# Patient Record
Sex: MALE | Race: WHITE | Employment: FULL TIME | URBAN - NONMETROPOLITAN AREA
[De-identification: names, ages, dates, MRNs, and addresses within clinical notes are randomized per-mention and may not be internally consistent; named-entity substitution may affect disease eponyms.]

---

## 2019-07-20 ENCOUNTER — OFFICE VISIT (OUTPATIENT)
Dept: URGENT CARE | Age: 44
End: 2019-07-20
Payer: MEDICAID

## 2019-07-20 VITALS
DIASTOLIC BLOOD PRESSURE: 88 MMHG | HEART RATE: 106 BPM | RESPIRATION RATE: 18 BRPM | BODY MASS INDEX: 42.7 KG/M2 | SYSTOLIC BLOOD PRESSURE: 138 MMHG | OXYGEN SATURATION: 97 % | WEIGHT: 305 LBS | TEMPERATURE: 97.6 F | HEIGHT: 71 IN

## 2019-07-20 DIAGNOSIS — M54.42 ACUTE MIDLINE LOW BACK PAIN WITH BILATERAL SCIATICA: ICD-10-CM

## 2019-07-20 DIAGNOSIS — M54.41 ACUTE MIDLINE LOW BACK PAIN WITH BILATERAL SCIATICA: ICD-10-CM

## 2019-07-20 PROBLEM — M54.40 ACUTE MIDLINE LOW BACK PAIN WITH SCIATICA: Status: ACTIVE | Noted: 2019-07-20

## 2019-07-20 PROCEDURE — 99203 OFFICE O/P NEW LOW 30 MIN: CPT | Performed by: NURSE PRACTITIONER

## 2019-07-20 RX ORDER — MELOXICAM 15 MG/1
15 TABLET ORAL DAILY
Qty: 30 TABLET | Refills: 0 | Status: SHIPPED | OUTPATIENT
Start: 2019-07-20

## 2019-07-20 RX ORDER — DEXAMETHASONE SODIUM PHOSPHATE 100 MG/10ML
10 INJECTION INTRAMUSCULAR; INTRAVENOUS ONCE
Status: COMPLETED | OUTPATIENT
Start: 2019-07-20 | End: 2019-07-20

## 2019-07-20 RX ADMIN — DEXAMETHASONE SODIUM PHOSPHATE 10 MG: 100 INJECTION INTRAMUSCULAR; INTRAVENOUS at 14:00

## 2019-07-20 SDOH — HEALTH STABILITY: MENTAL HEALTH: HOW OFTEN DO YOU HAVE A DRINK CONTAINING ALCOHOL?: NEVER

## 2019-07-20 ASSESSMENT — ENCOUNTER SYMPTOMS
DIARRHEA: 0
VOMITING: 0
EYE DISCHARGE: 0
EYE REDNESS: 0
WHEEZING: 0
SORE THROAT: 0
BACK PAIN: 1
COUGH: 0

## 2019-08-13 ENCOUNTER — TRANSCRIBE ORDERS (OUTPATIENT)
Dept: ADMINISTRATIVE | Facility: HOSPITAL | Age: 44
End: 2019-08-13

## 2019-08-13 DIAGNOSIS — M54.16 LUMBAR RADICULOPATHY: Primary | ICD-10-CM

## 2019-08-16 ENCOUNTER — HOSPITAL ENCOUNTER (OUTPATIENT)
Dept: MRI IMAGING | Facility: HOSPITAL | Age: 44
Discharge: HOME | End: 2019-08-16
Admitting: NURSE PRACTITIONER

## 2019-08-16 DIAGNOSIS — M54.16 LUMBAR RADICULOPATHY: ICD-10-CM

## 2019-08-16 PROCEDURE — 72148 MRI LUMBAR SPINE W/O DYE: CPT

## 2023-03-25 DIAGNOSIS — G47.31 COMPLEX SLEEP APNEA SYNDROME: Primary | ICD-10-CM

## 2023-04-20 ENCOUNTER — HOSPITAL ENCOUNTER (EMERGENCY)
Facility: HOSPITAL | Age: 48
Discharge: HOME | End: 2023-04-20
Attending: EMERGENCY MEDICINE
Payer: COMMERCIAL

## 2023-04-20 ENCOUNTER — APPOINTMENT (OUTPATIENT)
Dept: GENERAL RADIOLOGY | Facility: HOSPITAL | Age: 48
End: 2023-04-20
Payer: COMMERCIAL

## 2023-04-20 VITALS
SYSTOLIC BLOOD PRESSURE: 131 MMHG | OXYGEN SATURATION: 95 % | DIASTOLIC BLOOD PRESSURE: 84 MMHG | RESPIRATION RATE: 20 BRPM | TEMPERATURE: 98.1 F | BODY MASS INDEX: 44.1 KG/M2 | HEIGHT: 71 IN | WEIGHT: 315 LBS | HEART RATE: 80 BPM

## 2023-04-20 DIAGNOSIS — R06.2 WHEEZING: ICD-10-CM

## 2023-04-20 DIAGNOSIS — H65.01 RIGHT ACUTE SEROUS OTITIS MEDIA, RECURRENCE NOT SPECIFIED: Primary | ICD-10-CM

## 2023-04-20 DIAGNOSIS — J40 BRONCHITIS: ICD-10-CM

## 2023-04-20 LAB
FLUAV RNA RESP QL NAA+PROBE: NOT DETECTED
FLUBV RNA RESP QL NAA+PROBE: NOT DETECTED
SARS-COV-2 RNA RESP QL NAA+PROBE: NOT DETECTED

## 2023-04-20 PROCEDURE — 99283 EMERGENCY DEPT VISIT LOW MDM: CPT

## 2023-04-20 PROCEDURE — 71045 X-RAY EXAM CHEST 1 VIEW: CPT

## 2023-04-20 PROCEDURE — 87636 SARSCOV2 & INF A&B AMP PRB: CPT | Performed by: NURSE PRACTITIONER

## 2023-04-20 PROCEDURE — 94640 AIRWAY INHALATION TREATMENT: CPT

## 2023-04-20 PROCEDURE — 94799 UNLISTED PULMONARY SVC/PX: CPT

## 2023-04-20 RX ORDER — AMOXICILLIN AND CLAVULANATE POTASSIUM 875; 125 MG/1; MG/1
1 TABLET, FILM COATED ORAL 2 TIMES DAILY
Qty: 20 TABLET | Refills: 0 | Status: SHIPPED | OUTPATIENT
Start: 2023-04-20 | End: 2023-04-30

## 2023-04-20 RX ORDER — CETIRIZINE HYDROCHLORIDE, PSEUDOEPHEDRINE HYDROCHLORIDE 5; 120 MG/1; MG/1
1 TABLET, FILM COATED, EXTENDED RELEASE ORAL 2 TIMES DAILY
Qty: 15 TABLET | Refills: 0 | Status: SHIPPED | OUTPATIENT
Start: 2023-04-20

## 2023-04-20 RX ORDER — ALBUTEROL SULFATE 2.5 MG/3ML
2.5 SOLUTION RESPIRATORY (INHALATION) EVERY 4 HOURS PRN
Qty: 20 EACH | Refills: 0 | Status: SHIPPED | OUTPATIENT
Start: 2023-04-20

## 2023-04-20 RX ORDER — IPRATROPIUM BROMIDE AND ALBUTEROL SULFATE 2.5; .5 MG/3ML; MG/3ML
3 SOLUTION RESPIRATORY (INHALATION) ONCE
Status: COMPLETED | OUTPATIENT
Start: 2023-04-20 | End: 2023-04-20

## 2023-04-20 RX ORDER — FLUTICASONE PROPIONATE 50 MCG
2 SPRAY, SUSPENSION (ML) NASAL DAILY
Qty: 9.9 ML | Refills: 0 | Status: SHIPPED | OUTPATIENT
Start: 2023-04-20

## 2023-04-20 RX ORDER — BENZONATATE 200 MG/1
200 CAPSULE ORAL 3 TIMES DAILY PRN
Qty: 15 CAPSULE | Refills: 0 | Status: SHIPPED | OUTPATIENT
Start: 2023-04-20

## 2023-04-20 RX ADMIN — IPRATROPIUM BROMIDE AND ALBUTEROL SULFATE 3 ML: .5; 3 SOLUTION RESPIRATORY (INHALATION) at 14:35

## 2023-04-20 NOTE — ED PROVIDER NOTES
"Subjective   History of Present Illness  Patient is a 47-year-old male who presents to the ER with chief complaints of cough with congestion.  He states he began developing symptoms on Monday.  He states initially he thought he just had a cold and since this time his cough has been deeper, reports having recorded fever, and has noted wheezing.  He believes he may have, \"walking pneumonia.\"  He also complains of right ear pain and believes he has another ear infection.  Patient denies any chest pain.  He has had no nausea or vomiting.  Reports coughing up white to yellowish phlegm.  No significant past medical history on file.        Review of Systems   Constitutional: Positive for fever.   HENT: Positive for congestion, ear pain and rhinorrhea.    Respiratory: Positive for cough and wheezing. Negative for shortness of breath.    Gastrointestinal: Negative.  Negative for abdominal pain, diarrhea, nausea and vomiting.   Genitourinary: Negative.  Negative for dysuria.   Musculoskeletal: Negative.  Negative for myalgias.   Skin: Negative.    All other systems reviewed and are negative.      Past Medical History:   Diagnosis Date   • Apnea        Allergies   Allergen Reactions   • Erythromycin Other (See Comments)     unknown       History reviewed. No pertinent surgical history.    History reviewed. No pertinent family history.    Social History     Socioeconomic History   • Marital status: Single   Tobacco Use   • Smoking status: Every Day     Packs/day: 1.00     Types: Cigarettes   Substance and Sexual Activity   • Alcohol use: Never   • Drug use: Never           Objective   Physical Exam  Vitals and nursing note reviewed.   Constitutional:       General: He is not in acute distress.     Appearance: He is well-developed. He is not diaphoretic.      Comments: No acute distress, sitting up in a chair in no distress, nontoxic-appearing   HENT:      Head: Atraumatic.      Right Ear: Ear canal and external ear normal.      " Left Ear: Tympanic membrane, ear canal and external ear normal.      Ears:      Comments: Right erythematous TM     Nose: Nose normal.      Mouth/Throat:      Mouth: Mucous membranes are moist.   Eyes:      General: No scleral icterus.     Extraocular Movements: Extraocular movements intact.      Conjunctiva/sclera: Conjunctivae normal.      Pupils: Pupils are equal, round, and reactive to light.   Neck:      Thyroid: No thyromegaly.      Vascular: No JVD.   Cardiovascular:      Rate and Rhythm: Normal rate and regular rhythm.      Heart sounds: Normal heart sounds. No murmur heard.  Pulmonary:      Effort: Pulmonary effort is normal. No respiratory distress.      Breath sounds: Wheezing and rales present.   Chest:      Chest wall: No tenderness.   Abdominal:      General: Bowel sounds are normal. There is no distension.      Palpations: Abdomen is soft. There is no mass.      Tenderness: There is no abdominal tenderness. There is no guarding or rebound.   Musculoskeletal:         General: Normal range of motion.      Cervical back: Normal range of motion and neck supple.   Lymphadenopathy:      Cervical: No cervical adenopathy.   Skin:     General: Skin is warm and dry.      Capillary Refill: Capillary refill takes less than 2 seconds.      Coloration: Skin is not pale.      Findings: No erythema or rash.   Neurological:      Mental Status: He is alert and oriented to person, place, and time.      Cranial Nerves: No cranial nerve deficit.      Coordination: Coordination normal.      Deep Tendon Reflexes: Reflexes are normal and symmetric.   Psychiatric:         Mood and Affect: Mood normal.         Behavior: Behavior normal.         Thought Content: Thought content normal.         Judgment: Judgment normal.         Procedures           ED Course                                           Medical Decision Making  ViralPatient is a 47-year-old male who presents to the ER with chief complaints of cough with congestion.  " He states he began developing symptoms on Monday.  He states initially he thought he just had a cold and since this time his cough has been deeper, reports having recorded fever, and has noted wheezing.  He believes he may have, \"walking pneumonia.\"  He also complains of right ear pain and believes he has another ear infection.  Patient denies any chest pain.  He has had no nausea or vomiting.  Reports coughing up white to yellowish phlegm.  No significant past medical history on file.     Patient tells the nursing staff he just started using a cpap last night for the first time.  Differential diagnosis: Illness, bronchitis, pneumonia, otitis media, and other    Labs Reviewed  COVID-19 AND FLU A/B, NP SWAB IN TRANSPORT MEDIA 8-12 HR TAT - Normal  XR Chest 1 View   Final Result    Moderate pulmonary hypoventilation, no acute infiltrates.    This report was finalized on 04/20/2023 14:24 by Dr. Jose Burt MD.  Patient received a breathing treatment in the emergency department.  We will prescribe breathing treatments, antibiotics for his otitis media, cough medicine, nasal spray, and a decongestant with antihistamine.  He will need to follow-up with his PCP for reevaluation.    Bronchitis: acute illness or injury  Right acute serous otitis media, recurrence not specified: acute illness or injury  Wheezing: acute illness or injury  Amount and/or Complexity of Data Reviewed  Radiology: ordered.      Risk  OTC drugs.  Prescription drug management.          Final diagnoses:   Right acute serous otitis media, recurrence not specified   Bronchitis   Wheezing       ED Disposition  ED Disposition     ED Disposition   Discharge    Condition   Good    Comment   --             No follow-up provider specified.       Medication List      New Prescriptions    albuterol (2.5 MG/3ML) 0.083% nebulizer solution  Commonly known as: PROVENTIL  Take 2.5 mg by nebulization Every 4 (Four) Hours As Needed for Wheezing.   "   amoxicillin-clavulanate 875-125 MG per tablet  Commonly known as: AUGMENTIN  Take 1 tablet by mouth 2 (Two) Times a Day for 10 days.     benzonatate 200 MG capsule  Commonly known as: TESSALON  Take 1 capsule by mouth 3 (Three) Times a Day As Needed for Cough.     cetirizine-pseudoephedrine 5-120 MG per 12 hr tablet  Commonly known as: ZyrTEC-D  Take 1 tablet by mouth 2 (Two) Times a Day.     fluticasone 50 MCG/ACT nasal spray  Commonly known as: FLONASE  2 sprays into the nostril(s) as directed by provider Daily.           Where to Get Your Medications      These medications were sent to [facility] [place] [id number] - [phone] [facility] [phone] [address] [place] [address]    Phone: [phone]   · albuterol (2.5 MG/3ML) 0.083% nebulizer solution  · amoxicillin-clavulanate 875-125 MG per tablet  · benzonatate 200 MG capsule  · cetirizine-pseudoephedrine 5-120 MG per 12 hr tablet  · fluticasone 50 MCG/ACT nasal spray          Cecile Schumacher, APRN  04/20/23 [id number]

## 2023-04-20 NOTE — DISCHARGE INSTRUCTIONS
Breathing treatments every 4-6 hours as needed for wheezing  Push fluids; maintain fever control; f/u with pcp with no improvements

## 2023-05-16 ENCOUNTER — OFFICE VISIT (OUTPATIENT)
Dept: PULMONOLOGY | Age: 48
End: 2023-05-16
Payer: MEDICAID

## 2023-05-16 VITALS
HEART RATE: 88 BPM | BODY MASS INDEX: 44.1 KG/M2 | SYSTOLIC BLOOD PRESSURE: 136 MMHG | OXYGEN SATURATION: 96 % | TEMPERATURE: 97.6 F | WEIGHT: 315 LBS | DIASTOLIC BLOOD PRESSURE: 78 MMHG | HEIGHT: 71 IN

## 2023-05-16 DIAGNOSIS — F17.210 CIGARETTE NICOTINE DEPENDENCE WITHOUT COMPLICATION: ICD-10-CM

## 2023-05-16 DIAGNOSIS — G47.31 COMPLEX SLEEP APNEA SYNDROME: Primary | ICD-10-CM

## 2023-05-16 DIAGNOSIS — R06.09 DYSPNEA ON EXERTION: ICD-10-CM

## 2023-05-16 DIAGNOSIS — E66.01 MORBID OBESITY (HCC): ICD-10-CM

## 2023-05-16 PROCEDURE — 99204 OFFICE O/P NEW MOD 45 MIN: CPT | Performed by: INTERNAL MEDICINE

## 2023-05-16 ASSESSMENT — ENCOUNTER SYMPTOMS
SHORTNESS OF BREATH: 0
CHEST TIGHTNESS: 0
COUGH: 0
RHINORRHEA: 0
APNEA: 0
WHEEZING: 0
BACK PAIN: 0
ABDOMINAL PAIN: 0
ABDOMINAL DISTENTION: 0
ANAL BLEEDING: 0

## 2023-05-16 NOTE — PROGRESS NOTES
Pulmonary and Sleep Medicine    Jessenia Viramontes (:  1975) is a 49 y.o. male,New patient, here for evaluation of the following chief complaint(s):  No chief complaint on file. Referring physician:  No referring provider defined for this encounter. ASSESSMENT/PLAN:  1. Complex sleep apnea syndrome  2. Dyspnea on exertion  3. Cigarette nicotine dependence without complication. Discussed smoking cessation for 3.5 min. 4. Morbid obesity ([address]        Discussed different prophylaxis with the BiPAP. We advised patient to use his a Vitera mask from the sleep lab and try again. We also discussed keeping the water container full. Will re-evaluate in 4 weeks. Twan Taylor MD, [facility], [facility]    Return in about 4 weeks (around 2023). SUBJECTIVE/OBJECTIVE:  The patient is here for evaluation of sleep apnea. He had a sleep study done in March that showed severe complex sleep apnea with an apnea-hypopnea index of 111 events per hour. He underwent titration and he needed to be on BiPAP. He says initially his BiPAP was review of helpful with his symptoms. Then he started developing a difficulty with the headgear. He says his headgear leaks. It causes him to be dry. He had recurrence of his symptoms with sleepiness during the daytime. I was asked to see him regarding above. He is using Benefiber with his CPAP. He says he usually about the morning water container is empty. Prior to Visit Medications    Not on File        Review of Systems   Constitutional:  Negative for activity change, appetite change, chills, diaphoresis and fatigue. HENT:  Negative for congestion, dental problem, drooling, ear discharge, postnasal drip and rhinorrhea. Eyes:  Negative for visual disturbance. Respiratory:  Negative for apnea, cough, chest tightness, shortness of breath and wheezing. Gastrointestinal:  Negative for abdominal distention, abdominal pain and anal bleeding.

## 2023-06-19 ENCOUNTER — OFFICE VISIT (OUTPATIENT)
Dept: PULMONOLOGY | Age: 48
End: 2023-06-19
Payer: MEDICAID

## 2023-06-19 VITALS
SYSTOLIC BLOOD PRESSURE: 136 MMHG | HEART RATE: 89 BPM | HEIGHT: 71 IN | TEMPERATURE: 97.8 F | DIASTOLIC BLOOD PRESSURE: 76 MMHG | OXYGEN SATURATION: 96 % | BODY MASS INDEX: 44.1 KG/M2 | WEIGHT: 315 LBS

## 2023-06-19 DIAGNOSIS — G47.31 COMPLEX SLEEP APNEA SYNDROME: Primary | ICD-10-CM

## 2023-06-19 DIAGNOSIS — R06.09 DYSPNEA ON EXERTION: ICD-10-CM

## 2023-06-19 DIAGNOSIS — F17.210 CIGARETTE NICOTINE DEPENDENCE WITHOUT COMPLICATION: ICD-10-CM

## 2023-06-19 DIAGNOSIS — E66.01 MORBID OBESITY (HCC): ICD-10-CM

## 2023-06-19 PROCEDURE — 99214 OFFICE O/P EST MOD 30 MIN: CPT | Performed by: INTERNAL MEDICINE

## 2023-06-19 ASSESSMENT — ENCOUNTER SYMPTOMS
ABDOMINAL PAIN: 0
SHORTNESS OF BREATH: 0
ANAL BLEEDING: 0
BACK PAIN: 0
APNEA: 0
ABDOMINAL DISTENTION: 0
COUGH: 0
WHEEZING: 0
CHEST TIGHTNESS: 0
RHINORRHEA: 0

## 2023-06-19 NOTE — PROGRESS NOTES
Pulmonary and Sleep Medicine    Viridiana Leggett (:  1975) is a 49 y.o. male,Established patient, here for evaluation of the following chief complaint(s):  Follow-up (Follow up- [facility] )      Referring physician:  No referring provider defined for this encounter. ASSESSMENT/PLAN:  1. Complex sleep apnea syndrome  2. Dyspnea on exertion  3. Cigarette nicotine dependence without complication. Discussed smoking cessation for 3.5 min. 4. Morbid obesity ([address]        Continue current management with the CPAP he is compliant with the CPAP he feels it helps. We need a CPAP download the patient has to take his CPAP machine to the [facility] for download. The machine is not wirelessly enabled. Jacquelyn Flores MD, [facility], [facility]    Return in about 6 months (around 2023). SUBJECTIVE/OBJECTIVE:  He is here for follow up on sleep apnea. He is using the CPAP and is compliant with the CPAP and feels the CPAP is helping him. The vitera mask is working much better for him with no leaks. Prior to Visit Medications    Not on File        Review of Systems   Constitutional:  Negative for activity change, appetite change, chills, diaphoresis and fatigue. HENT:  Negative for congestion, dental problem, drooling, ear discharge, postnasal drip and rhinorrhea. Eyes:  Negative for visual disturbance. Respiratory:  Negative for apnea, cough, chest tightness, shortness of breath and wheezing. Gastrointestinal:  Negative for abdominal distention, abdominal pain and anal bleeding. Endocrine: Negative for cold intolerance, heat intolerance and polydipsia. Genitourinary:  Negative for difficulty urinating, dysuria, enuresis and flank pain. Musculoskeletal:  Negative for arthralgias, back pain and gait problem. Allergic/Immunologic: Negative for environmental allergies. Neurological:  Negative for dizziness, facial asymmetry, light-headedness and headaches.      Vitals:    23 1452   BP:

## 2023-11-17 ENCOUNTER — APPOINTMENT (OUTPATIENT)
Dept: GENERAL RADIOLOGY | Facility: HOSPITAL | Age: 48
End: 2023-11-17
Payer: COMMERCIAL

## 2023-11-17 ENCOUNTER — HOSPITAL ENCOUNTER (EMERGENCY)
Facility: HOSPITAL | Age: 48
Discharge: HOME | End: 2023-11-17
Payer: COMMERCIAL

## 2023-11-17 VITALS
BODY MASS INDEX: 44.1 KG/M2 | SYSTOLIC BLOOD PRESSURE: 132 MMHG | HEIGHT: 71 IN | RESPIRATION RATE: 18 BRPM | TEMPERATURE: 98.2 F | OXYGEN SATURATION: 95 % | DIASTOLIC BLOOD PRESSURE: 80 MMHG | HEART RATE: 99 BPM | WEIGHT: 315 LBS

## 2023-11-17 DIAGNOSIS — J10.1 INFLUENZA B: Primary | ICD-10-CM

## 2023-11-17 LAB
B PARAPERT DNA SPEC QL NAA+PROBE: NOT DETECTED
B PERT DNA SPEC QL NAA+PROBE: NOT DETECTED
C PNEUM DNA NPH QL NAA+NON-PROBE: NOT DETECTED
FLUAV SUBTYP SPEC NAA+PROBE: NOT DETECTED
FLUBV RNA ISLT QL NAA+PROBE: DETECTED
HADV DNA SPEC NAA+PROBE: NOT DETECTED
HCOV 229E RNA SPEC QL NAA+PROBE: NOT DETECTED
HCOV HKU1 RNA SPEC QL NAA+PROBE: NOT DETECTED
HCOV NL63 RNA SPEC QL NAA+PROBE: NOT DETECTED
HCOV OC43 RNA SPEC QL NAA+PROBE: NOT DETECTED
HMPV RNA NPH QL NAA+NON-PROBE: NOT DETECTED
HPIV1 RNA ISLT QL NAA+PROBE: NOT DETECTED
HPIV2 RNA SPEC QL NAA+PROBE: NOT DETECTED
HPIV3 RNA NPH QL NAA+PROBE: NOT DETECTED
HPIV4 P GENE NPH QL NAA+PROBE: NOT DETECTED
M PNEUMO IGG SER IA-ACNC: NOT DETECTED
RHINOVIRUS RNA SPEC NAA+PROBE: NOT DETECTED
RSV RNA NPH QL NAA+NON-PROBE: NOT DETECTED
SARS-COV-2 RNA NPH QL NAA+NON-PROBE: NOT DETECTED

## 2023-11-17 PROCEDURE — 99283 EMERGENCY DEPT VISIT LOW MDM: CPT

## 2023-11-17 PROCEDURE — 0202U NFCT DS 22 TRGT SARS-COV-2: CPT

## 2023-11-17 PROCEDURE — 71046 X-RAY EXAM CHEST 2 VIEWS: CPT

## 2023-11-17 NOTE — Clinical Note
[facility] EMERGENCY DEPARTMENT  [address] [place] [address] AVE  [facility] [address]  Phone: [phone]    Herminio Donovan was seen and treated in our emergency department on 11/17/2023.  He may return to work on 11/21/2023.         Thank you for choosing [facility].    Po Neely, APRN

## 2023-11-18 NOTE — ED PROVIDER NOTES
Subjective   History of Present Illness  48 yom presents with c/o cough, fever, chills and body aches.  He states Sunday he worked outside on his car out in the cold. On Monday, he developed URI symptoms.  He is unsure how high his fever has been but he feels like he has had one.  He denies CP or SOB.  He states he feels like he has pneumonia.        Review of Systems   Constitutional:  Negative for activity change, appetite change, fatigue and fever.   HENT:  Negative for congestion, ear pain, facial swelling and sore throat.    Eyes:  Negative for discharge and visual disturbance.   Respiratory:  Positive for cough. Negative for apnea, chest tightness, shortness of breath, wheezing and stridor.    Cardiovascular:  Negative for chest pain and palpitations.   Gastrointestinal:  Negative for abdominal distention, abdominal pain, diarrhea, nausea and vomiting.   Genitourinary:  Negative for difficulty urinating and dysuria.   Musculoskeletal:  Negative for arthralgias and myalgias.   Skin:  Negative for rash and wound.   Neurological:  Negative for dizziness and seizures.   Psychiatric/Behavioral:  Negative for agitation and confusion.        Past Medical History:   Diagnosis Date   • Apnea        Allergies   Allergen Reactions   • Azithromycin Unknown - Low Severity   • Erythromycin Other (See Comments)     unknown       History reviewed. No pertinent surgical history.    History reviewed. No pertinent family history.    Social History     Socioeconomic History   • Marital status: Single   Tobacco Use   • Smoking status: Every Day     Packs/day: 1     Types: Cigarettes   Substance and Sexual Activity   • Alcohol use: Never   • Drug use: Never           Objective   Physical Exam  Vitals and nursing note reviewed.   Constitutional:       General: He is not in acute distress.     Appearance: He is well-developed. He is not ill-appearing or toxic-appearing.   HENT:      Head: Normocephalic.   Eyes:      Pupils: Pupils  are equal, round, and reactive to light.   Cardiovascular:      Rate and Rhythm: Normal rate and regular rhythm.      Heart sounds: No murmur heard.  Pulmonary:      Effort: Pulmonary effort is normal. No respiratory distress.      Breath sounds: Normal breath sounds. No stridor. No wheezing, rhonchi or rales.   Abdominal:      General: Bowel sounds are normal.      Palpations: Abdomen is soft.   Musculoskeletal:         General: Normal range of motion.      Cervical back: Normal range of motion and neck supple.   Skin:     General: Skin is warm and dry.   Neurological:      Mental Status: He is alert and oriented to person, place, and time.         Procedures           ED Course  ED Course as of 11/17/23 2008 Fri Nov 17, 2023 1918 Chest xray - IMPRESSION: 1. No acute appearing infiltrate or effusion. 2. Mild bronchial wall thickening, stable.  Respiratory panel is pending.       [KS]   1954 Respiratory panel + for influenza B.  I reviewed testing results with him.  He voiced understanding of results and instructions including strict return precautions.  [KS]      ED Course User Index  [KS] Po Neely APRN                                           Medical Decision Making  48 yom presents with c/o cough, fever, chills and body aches.  He states Sunday he worked outside on his car out in the cold. On Monday, he developed URI symptoms.  He is unsure how high his fever has been but he feels like he has had one.  He denies CP or SOB.  He states he feels like he has pneumonia.      Chest xray - IMPRESSION: 1. No acute appearing infiltrate or effusion. 2. Mild bronchial wall thickening, stable.  Respiratory pane is pending.     Respiratory panel + for influenza B.  I reviewed testing results with him.  He voiced understanding of results and instructions including strict return precautions.      Problems Addressed:  Influenza B: acute illness or injury    Amount and/or Complexity of Data Reviewed  Labs:   Decision-making details documented in ED Course.  Radiology:  Decision-making details documented in ED Course.        Final diagnoses:   Influenza B       ED Disposition  ED Disposition       ED Disposition   Discharge    Condition   Stable    Comment   --               Provider, No Known  [facility]  [place] [address]  [phone]    Call in 3 days  Routine ED follow up         Medication List      No changes were made to your prescriptions during this visit.            oP Neely, APRN  11/17/23 2008

## 2023-11-18 NOTE — DISCHARGE INSTRUCTIONS
Follow up with one of the [facility] physician groups below to setup primary care. If you have trouble making an appointment, please call the [facility] Nurse Line at [phone]    [facility] - [place]  [address] [place] [address]  [id number] [phone]    [facility] Internal Medicine - [address], [place] [address] [phone]    [facility] - [place] [address], [address], [place] [address] [phone]     [facility] ([facility]) - [address], [address], [place] [address] [phone]    [facility] - [address], [address] [phone]    [facility] - [address], [place] [address] [phone]    [facility] - [place]  [address], [place], [address] [phone]    [facility] - [place]  [address] [place] [address], [address] [phone]    [facility] - [address] [address] [phone]    [facility] - [place]  [address] [place] [address], [place], [address] [phone]        PEDIATRIC:    [facility] - [place] [address], [address], [place] [address] [phone]    Drink plenty of fluid.  Tylenol or motrin as needed for pain/fever.  Over the counter medication as needed for symptoms.  Follow up with PCP - call Monday for appointment. Return to ED if condition worsens or new symptoms develop